# Patient Record
Sex: MALE | Race: WHITE | NOT HISPANIC OR LATINO | Employment: FULL TIME | URBAN - METROPOLITAN AREA
[De-identification: names, ages, dates, MRNs, and addresses within clinical notes are randomized per-mention and may not be internally consistent; named-entity substitution may affect disease eponyms.]

---

## 2020-11-23 ENCOUNTER — TELEPHONE (OUTPATIENT)
Dept: NEUROSURGERY | Facility: CLINIC | Age: 60
End: 2020-11-23

## 2020-11-23 NOTE — TELEPHONE ENCOUNTER
CALLED PT TO SCHEDULE APPT FROM REFERRAL/PT ADVISED THAT HE WAS SEEN AT [facility] THIS PAST Saturday 11/21/2020/PT ADVISED HE DID NOT HAVE ANY ADDITIONAL IMAGING DONE/HE WAS GIVEN AN INJECTION, STEROID AND FLEXERIL RX/STATES HE IS NOT HAVING ANY BOWEL OR BLADDER ISSUE BUT IS DUE TO RETURN TO WORK TODAY AND NOT SURE HOW HIS BACK IS GOING TO DO.     PLEASE CONTACT PT AT: [phone].    THANK YOU!

## 2020-12-04 NOTE — PROGRESS NOTES
"Subjective   History of Present Illness: Mitch Finney is a 60 y.o. male is here today as a self referral for chronic back pain . MRI [facility] 11/13/20.     He states he has chronic back pain. Previous back surgery by Dr. Felipe Diaz many years ago. He bent over last month to his dog and felt a \"pop\" in his back. He recently completed MDP with little relief.  Mr. Finney takes Ibuprofen 800 mg prn for pain.  The pain is much better than it was when the symptoms began in November.  He is just frustrated because his back will continue to go out about once a year like this and he will miss a couple of days of work and have to seek medical treatment.  He was hoping for an option to help him avoid or truncate these episodes more quickly.    While in the room and during my examination of the patient I wore a mask and eye protection.  I washed my hands before and after this patient encounter.  The patient was also wearing a mask.    Back Pain  The problem occurs constantly. The pain is present in the lumbar spine. The quality of the pain is described as aching. The pain is at a severity of 5/10. The pain is moderate. Associated symptoms include a fever, leg pain, numbness and weakness (L leg ). Pertinent negatives include no bladder incontinence or bowel incontinence. Treatments tried: MDP    Leg Pain   The pain is present in the left leg and right leg. The quality of the pain is described as aching. The pain is at a severity of 6/10. The pain is moderate. The pain has been intermittent since onset. Associated symptoms include numbness. Treatments tried: MDP        The following portions of the patient's history were reviewed and updated as appropriate: allergies, current medications, past family history, past medical history, past social history, past surgical history and problem list.    Review of Systems   Constitutional: Positive for fever. Negative for chills.   HENT: Negative.  Negative for congestion and sore " "throat.    Eyes: Negative.    Respiratory: Negative.    Cardiovascular: Negative.    Gastrointestinal: Negative.  Negative for bowel incontinence.   Endocrine: Negative.    Genitourinary: Negative.  Negative for bladder incontinence, difficulty urinating, testicular pain and urgency.   Musculoskeletal: Positive for back pain (thigh pain ). Negative for gait problem.   Skin: Negative.    Allergic/Immunologic: Negative.    Neurological: Positive for weakness (L leg ) and numbness.   Hematological: Negative.    Psychiatric/Behavioral: Negative.        Objective     Vitals:    12/18/20 1107   BP: 155/90   Pulse: 58   Temp: 98.7 °F (37.1 °C)   Weight: 94.5 kg (208 lb 6.4 oz)   Height: 188 cm (74\")     Body mass index is 26.76 kg/m².      Physical Exam  Neurologic Exam    Physical Exam:    CONSTITUTIONAL: This 60 year old right handed  male appears well developed, well-nourished and in no acute distress.    HEAD & FACE: the head and face are symmetric, normocephalic and atraumatic.    EYES: Inspection of the conjunctivae and lids reveals no swelling, erythema or discharge.  Pupils are round, equal and reactive to light and there is no scleral icterus.    EARS, NOSE, MOUTH & THROAT: On inspection, the ears and nose are within normal limits.    NECK: the neck is supple and symmetric. The trachea is midline with no masses.    PULMONARY: Respiratory effort is normal with no increased work of breathing or signs of respiratory distress.    CARDIOVASCULAR: Pedal pulses are +2/4 bilaterally. Examination of the extremities shows no edema or varicosities.    LYMPHATIC: There is no palpable lymphadenopathy of the neck.    MUSCULOSKELETAL: Gait and station are within normal limits. The spine has normal alignment and range of motion.    SKIN: The skin is warm, dry and intact, lumbar incision is well-healed    NEUROLOGIC:   Cranial Nerves 2-12 intact  Normal motor strength noted. Muscle bulk and tone are normal.  Sensory exam " reveals numbness on the lateral thigh above the knee on the left  Reflexes on the right side demonstrates 2/4 Knee Jerk Reflex, 1/4 Ankle Jerk Reflex and no ankle clonus on the right.   Reflexes on the left side demonstrates 2/4 Knee Jerk Reflex, 1/4 Ankle Jerk Reflex and no ankle clonus on the left.  Superficial/Primitive Reflexes: primitive reflexes were absent.  Pugh's, Babinski, and Clonus signs all negative.  No coordination deficit observed.  Radicular testing showed a negative Randall (RANDY) test and negative straight leg raise.  Cortical function is intact and without deficits. Speech is normal.    PSYCHIATRIC: oriented to person, place and time. Patient's mood and affect are normal.    Assessment/Plan   Independent Review of Radiographic Studies:      I personally reviewed the images from the following studies.    MRI done at a [facility] facility on 11/13/2020 reveals moderate disc bulge at L3-4 causing mild to moderate canal stenosis and left greater than right neuroforaminal narrowing.  There is an annular irregularity at L4-5 which contributes to only mild canal stenosis and mild to moderate neuroforaminal narrowing.    Medical Decision Making:      From the neurosurgical perspective there is no surgical option for isolated back pain. There is no persistent radiculopathy or loss of nerve function on exam to justify surgery. Generally most people respond favorably to physical therapy, NSAIDs or oral steroids when appropriate.  His symptoms are intermittent exacerbations and he has taken NSAIDs and oral steroids with temporary results.    In difficult situations like this one, pain management such as lumbar epidural steroids or facet rhizotomies are an option.  His symptoms are episodic and occur about once a year so I think it is more important that he develops a relationship with pain management so when he has a severe exacerbation he can get treatment quickly.    Since surgical pathology  was not identified radiographically or clinically, we will initiate conservative treatment and only plan on follow up as needed if surgical questions arise.    No follow-ups on file.    Diagnoses and all orders for this visit:    1. Chronic midline low back pain without sciatica (Primary)  -     Ambulatory Referral to Pain Management             Nicholas Cruz MD [facility]  Neurological Surgery

## 2020-12-18 ENCOUNTER — OFFICE VISIT (OUTPATIENT)
Dept: NEUROSURGERY | Facility: CLINIC | Age: 60
End: 2020-12-18

## 2020-12-18 VITALS
TEMPERATURE: 98.7 F | BODY MASS INDEX: 26.75 KG/M2 | HEIGHT: 74 IN | SYSTOLIC BLOOD PRESSURE: 155 MMHG | HEART RATE: 58 BPM | DIASTOLIC BLOOD PRESSURE: 90 MMHG | WEIGHT: 208.4 LBS

## 2020-12-18 DIAGNOSIS — G89.29 CHRONIC MIDLINE LOW BACK PAIN WITHOUT SCIATICA: Primary | ICD-10-CM

## 2020-12-18 DIAGNOSIS — M54.50 CHRONIC MIDLINE LOW BACK PAIN WITHOUT SCIATICA: Primary | ICD-10-CM

## 2020-12-18 PROCEDURE — 99204 OFFICE O/P NEW MOD 45 MIN: CPT | Performed by: NEUROLOGICAL SURGERY

## 2020-12-18 RX ORDER — IBUPROFEN 800 MG/1
TABLET ORAL
COMMUNITY
Start: 2020-11-06 | End: 2022-02-04

## 2021-03-16 ENCOUNTER — BULK ORDERING (OUTPATIENT)
Dept: CASE MANAGEMENT | Facility: OTHER | Age: 61
End: 2021-03-16

## 2021-03-16 ENCOUNTER — HOSPITAL ENCOUNTER (OUTPATIENT)
Dept: VACCINE CLINIC | Facility: HOSPITAL | Age: 61
Discharge: HOME | End: 2021-03-16
Attending: INTERNAL MEDICINE

## 2021-03-16 DIAGNOSIS — Z23 IMMUNIZATION DUE: ICD-10-CM

## 2021-04-06 ENCOUNTER — HOSPITAL ENCOUNTER (OUTPATIENT)
Dept: VACCINE CLINIC | Facility: HOSPITAL | Age: 61
Discharge: HOME | End: 2021-04-06
Attending: INTERNAL MEDICINE

## 2021-10-03 ENCOUNTER — DOCUMENTATION (OUTPATIENT)
Dept: UROLOGY | Facility: CLINIC | Age: 61
End: 2021-10-03

## 2021-10-03 DIAGNOSIS — N52.9 ERECTILE DYSFUNCTION, UNSPECIFIED ERECTILE DYSFUNCTION TYPE: Primary | ICD-10-CM

## 2021-10-03 RX ORDER — TADALAFIL 20 MG/1
20 TABLET ORAL DAILY PRN
Qty: 30 TABLET | Refills: 4 | Status: SHIPPED | OUTPATIENT
Start: 2021-10-03 | End: 2022-12-13 | Stop reason: SDUPTHER

## 2021-11-30 ENCOUNTER — IMMUNIZATION (OUTPATIENT)
Dept: VACCINE CLINIC | Facility: HOSPITAL | Age: 61
End: 2021-11-30

## 2021-11-30 PROCEDURE — 91300 HC SARSCOV02 VAC 30MCG/0.3ML IM: CPT | Performed by: INTERNAL MEDICINE

## 2021-11-30 PROCEDURE — 0004A HC ADM SARSCOV2 30MCG/0.3ML BOOSTER: CPT | Performed by: INTERNAL MEDICINE

## 2022-01-11 ENCOUNTER — DOCUMENTATION (OUTPATIENT)
Dept: UROLOGY | Facility: CLINIC | Age: 62
End: 2022-01-11

## 2022-01-11 DIAGNOSIS — J06.9 ACUTE UPPER RESPIRATORY INFECTION: Primary | ICD-10-CM

## 2022-01-11 RX ORDER — AZITHROMYCIN 250 MG/1
TABLET, FILM COATED ORAL
Qty: 6 TABLET | Refills: 0 | Status: SHIPPED | OUTPATIENT
Start: 2022-01-11 | End: 2022-01-16

## 2022-01-11 RX ORDER — PREDNISONE 20 MG/1
20 TABLET ORAL DAILY
Qty: 5 TABLET | Refills: 0 | Status: SHIPPED | OUTPATIENT
Start: 2022-01-11 | End: 2022-01-16

## 2022-02-04 ENCOUNTER — DOCUMENTATION (OUTPATIENT)
Dept: UROLOGY | Facility: CLINIC | Age: 62
End: 2022-02-04

## 2022-02-04 DIAGNOSIS — M62.830 SPASM OF THORACIC BACK MUSCLE: Primary | ICD-10-CM

## 2022-02-04 RX ORDER — BACLOFEN 10 MG/1
10 TABLET ORAL DAILY
Qty: 7 TABLET | Refills: 0 | Status: SHIPPED | OUTPATIENT
Start: 2022-02-04 | End: 2022-07-11

## 2022-02-04 RX ORDER — MELOXICAM 15 MG/1
15 TABLET ORAL DAILY
Qty: 7 TABLET | Refills: 0 | Status: SHIPPED | OUTPATIENT
Start: 2022-02-04 | End: 2022-02-11

## 2022-04-04 ENCOUNTER — DOCUMENTATION (OUTPATIENT)
Dept: UROLOGY | Facility: CLINIC | Age: 62
End: 2022-04-04

## 2022-04-04 DIAGNOSIS — M62.830 SPASM OF THORACIC BACK MUSCLE: Primary | ICD-10-CM

## 2022-04-04 RX ORDER — MELOXICAM 15 MG/1
15 TABLET ORAL DAILY
Qty: 30 TABLET | Refills: 2 | Status: SHIPPED | OUTPATIENT
Start: 2022-04-04 | End: 2022-06-24 | Stop reason: SDUPTHER

## 2022-05-27 DIAGNOSIS — J06.9 ACUTE UPPER RESPIRATORY INFECTION: Primary | ICD-10-CM

## 2022-05-27 RX ORDER — DOXYCYCLINE HYCLATE 100 MG/1
100 CAPSULE ORAL 2 TIMES DAILY
Qty: 20 CAPSULE | Refills: 0 | Status: SHIPPED | OUTPATIENT
Start: 2022-05-27 | End: 2022-06-24

## 2022-05-27 RX ORDER — BROMPHENIRAMINE MALEATE, PSEUDOEPHEDRINE HYDROCHLORIDE, AND DEXTROMETHORPHAN HYDROBROMIDE 2; 30; 10 MG/5ML; MG/5ML; MG/5ML
5 SYRUP ORAL 4 TIMES DAILY PRN
Qty: 240 ML | Refills: 0 | Status: SHIPPED | OUTPATIENT
Start: 2022-05-27 | End: 2022-06-24

## 2022-06-24 DIAGNOSIS — M62.830 SPASM OF THORACIC BACK MUSCLE: ICD-10-CM

## 2022-06-24 RX ORDER — MELOXICAM 15 MG/1
15 TABLET ORAL DAILY
Qty: 30 TABLET | Refills: 2 | Status: SHIPPED | OUTPATIENT
Start: 2022-06-24 | End: 2022-09-07 | Stop reason: SDUPTHER

## 2022-07-09 DIAGNOSIS — M62.830 SPASM OF THORACIC BACK MUSCLE: ICD-10-CM

## 2022-07-11 RX ORDER — BACLOFEN 10 MG/1
TABLET ORAL
Qty: 7 TABLET | Refills: 0 | Status: SHIPPED | OUTPATIENT
Start: 2022-07-11 | End: 2022-09-25 | Stop reason: SDUPTHER

## 2022-09-07 DIAGNOSIS — M62.830 SPASM OF THORACIC BACK MUSCLE: ICD-10-CM

## 2022-09-07 RX ORDER — MELOXICAM 15 MG/1
15 TABLET ORAL DAILY
Qty: 90 TABLET | Refills: 2 | Status: SHIPPED | OUTPATIENT
Start: 2022-09-07 | End: 2022-12-06

## 2022-09-25 ENCOUNTER — DOCUMENTATION (OUTPATIENT)
Dept: UROLOGY | Facility: CLINIC | Age: 62
End: 2022-09-25

## 2022-09-25 DIAGNOSIS — M62.830 SPASM OF THORACIC BACK MUSCLE: Primary | ICD-10-CM

## 2022-09-25 RX ORDER — BACLOFEN 10 MG/1
10 TABLET ORAL 2 TIMES DAILY
Qty: 14 TABLET | Refills: 0 | Status: SHIPPED | OUTPATIENT
Start: 2022-09-25 | End: 2022-10-02

## 2022-09-26 DIAGNOSIS — J06.9 ACUTE UPPER RESPIRATORY INFECTION: Primary | ICD-10-CM

## 2022-09-26 RX ORDER — PREDNISONE 50 MG/1
50 TABLET ORAL DAILY
Qty: 5 TABLET | Refills: 0 | Status: SHIPPED | OUTPATIENT
Start: 2022-09-26 | End: 2022-10-01

## 2022-12-13 DIAGNOSIS — N52.9 ERECTILE DYSFUNCTION, UNSPECIFIED ERECTILE DYSFUNCTION TYPE: ICD-10-CM

## 2022-12-13 RX ORDER — TADALAFIL 20 MG/1
20 TABLET ORAL DAILY PRN
Qty: 30 TABLET | Refills: 4 | Status: SHIPPED | OUTPATIENT
Start: 2022-12-13

## 2023-01-18 DIAGNOSIS — M62.830 SPASM OF THORACIC BACK MUSCLE: Primary | ICD-10-CM

## 2023-01-18 RX ORDER — PREDNISONE 50 MG/1
50 TABLET ORAL DAILY
Qty: 5 TABLET | Refills: 0 | Status: SHIPPED | OUTPATIENT
Start: 2023-01-18 | End: 2023-03-11 | Stop reason: SDUPTHER

## 2023-02-03 ENCOUNTER — APPOINTMENT (OUTPATIENT)
Dept: CT IMAGING | Facility: HOSPITAL | Age: 63
End: 2023-02-03
Payer: OTHER MISCELLANEOUS

## 2023-02-03 ENCOUNTER — HOSPITAL ENCOUNTER (EMERGENCY)
Facility: HOSPITAL | Age: 63
Discharge: HOME | End: 2023-02-03
Attending: EMERGENCY MEDICINE | Admitting: EMERGENCY MEDICINE
Payer: OTHER MISCELLANEOUS

## 2023-02-03 VITALS
DIASTOLIC BLOOD PRESSURE: 100 MMHG | OXYGEN SATURATION: 98 % | WEIGHT: 212 LBS | BODY MASS INDEX: 27.21 KG/M2 | HEART RATE: 62 BPM | SYSTOLIC BLOOD PRESSURE: 164 MMHG | HEIGHT: 74 IN | TEMPERATURE: 97.3 F | RESPIRATION RATE: 18 BRPM

## 2023-02-03 DIAGNOSIS — S16.1XXA STRAIN OF NECK MUSCLE, INITIAL ENCOUNTER: ICD-10-CM
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
## 2023-02-03 DIAGNOSIS — S09.90XA CLOSED HEAD INJURY, INITIAL ENCOUNTER: Primary | ICD-10-CM

## 2023-02-03 PROCEDURE — 99282 EMERGENCY DEPT VISIT SF MDM: CPT

## 2023-02-03 PROCEDURE — 72125 CT NECK SPINE W/O DYE: CPT

## 2023-02-03 PROCEDURE — 70450 CT HEAD/BRAIN W/O DYE: CPT

## 2023-02-03 NOTE — ED TRIAGE NOTES
Patient states he slipped on ice at work and fell and hit head, patient denies LOC or blood thinners.

## 2023-02-03 NOTE — ED NOTES
Pt reports slipping, falling, and hitting the back of his head at work. Pt reports mild vision changes when he first hit his head, but reports that his vision is back to baseline. Pt denies fatigue or dizziness.

## 2023-02-03 NOTE — DISCHARGE INSTRUCTIONS
Follow up with your primary care doctor within 48-72 hours. Rest. Take Acetaminophen (Tylenol) every 4-6 hours, as needed, for pain. Ice to neck with gentle stretching for 24-48 hours.  Often individuals develop a headache associated with nausea in the days/hours after a head injury. This is called a concussion and does not warrant a return to the ED UNLESS: you develop significant worsening of pain, profuse vomiting, dizziness, changes in vision, difficulty walking/speaking, weakness or numbness to your extremities.

## 2023-02-03 NOTE — ED PROVIDER NOTES
EMERGENCY DEPARTMENT ENCOUNTER    Room Number:  S04/04  Date of encounter:  2/3/2023  PCP: Hao Price MD  Patient Care Team:  Hao Price MD as PCP - General (Family Medicine)   Independent Historians: Patient        PPE: Patient was placed in face mask in first look. Patient was wearing facemask when I entered the room and throughout our encounter. I wore full protective equipment throughout this patient encounter including a face mask, and gloves. Hand hygiene was performed before donning protective equipment and after removal when leaving the room.      HPI:  Chief Complaint: Head injury  A complete HPI/ROS/PMH/PSH/SH/FH are unobtainable due to: Nothing    Chronic or social conditions impacting patient care (social determinants of health): None    Context: Mitch Finney is a 62 y.o. male who arrives to the ED via vehicle.  Patient presents with c/o closed head injury that he sustained after a slip and fall on the ice approximately 2 hours ago.  He states that he was walking in the parking lot at work when he slipped on a patch of ice, fell backwards and hit the back of his head.   Patient also complains of headache and neck pain.  Patient denies LOC, nausea, vomiting, visual changes, dizziness or lightheadedness, back pain or any injuries.  Patient states that nothing makes the symptoms better and nothing worsens symptoms.  Patient is not anticoagulated.    Review of prior external notes (non-ED): [facility], patient was seen by neurosurgery 12/18/2020 for chronic low back pain.  He was referred to pain management.    Review of prior external test results outside of this encounter: X-ray right shoulder 1/31/2022 no acute findings.    PAST MEDICAL HISTORY  Active Ambulatory Problems     Diagnosis Date Noted   • Chronic midline low back pain without sciatica 12/18/2020     Resolved Ambulatory Problems     Diagnosis Date Noted   • No Resolved Ambulatory Problems      No Additional Past Medical History       The patient has started, but not completed, their COVID-19 vaccination series.    PAST SURGICAL HISTORY  No past surgical history on file.      FAMILY HISTORY  No family history on file.      SOCIAL HISTORY  Social History     Socioeconomic History   • Marital status:          ALLERGIES  Patient has no known allergies.        REVIEW OF SYSTEMS  Review of Systems     All systems reviewed and negative except for those discussed in HPI.       PHYSICAL EXAM    I have reviewed the triage vital signs and nursing notes.    ED Triage Vitals   Temp Heart Rate Resp BP SpO2   02/03/23 1259 02/03/23 1259 02/03/23 1259 02/03/23 1345 02/03/23 1259   97.3 °F (36.3 °C) 59 18 (!) 175/106 98 %       Physical Exam  HENT:      Head:         GENERAL: Well appearing, nontoxic appearing, not distressed  HENT: normocephalic,  EYES: no scleral icterus, PERRL  CV: regular rhythm, regular rate, no murmur  RESPIRATORY: normal effort, CTAB  ABDOMEN: soft   MUSCULOSKELETAL: no deformity  Cervical vertebral tenderness to palpation no thoracic or lumbar  No step off or crepitus noted  NEURO: alert, moves all extremities, follows commands, mental status normal/baseline  Recent and remote memory functions are normal  Patient is attentive with normal concentration  Language is fluent  Speech is clear  Speech is nondysarthric  Symmetric smile with no facial droop  Eyes close shut strongly bilaterally  Symmetric eyebrow raise bilaterally  EOMI, PERRL  CN II-XII grossly normal otherwise  5/5 strength to bilateral upper and lower extremities  No pronator drift  Intact FNF   SKIN: warm, dry, no rash   Psych: Appropriate mood and affect  Nursing notes and vital signs reviewed          LAB RESULTS  No results found for this or any previous visit (from the past 24 hour(s)).    Ordered the above labs and independently reviewed the results.        RADIOLOGY  CT Head Without Contrast, CT Cervical Spine  Without Contrast    Result Date: 2/3/2023  CT HEAD AND CERVICAL SPINE  HISTORY: Slipped and fell on ice about 2 hours ago. The patient reportedly fell and hit the back of the head. Headache and neck pain.  TECHNIQUE: Noncontrast CT scans of the head and cervical spine with multiplanar reformatted images is provided. No previous imaging for correlation.  Radiation dose reduction techniques were utilized, including automated exposure control and exposure modulation based on body size.  FINDINGS: The ventricles are normal in caliber. The brain parenchyma, extra-axial spaces, and the bones of the skull appear normal. There is no intracranial hemorrhage. No scalp laceration or radiopaque foreign body identified. Orbital structures appear normal.  Cervical vertebral body height and alignment are normal. There is no cervical fracture. Mild degenerative change is observed between the odontoid and the anterior C1 ring.  No hemorrhage or prevertebral soft tissue edema is present.  There is degenerative disc change at C5-6 with loss of disc height and a posterior midline disc bulge which indents the thecal sac and moderately narrows the spinal canal to about 8 mm just to the left of midline. There is also degenerative disc change at C6-7 with minimal posterior disc bulge and mild canal stenosis. Mild osseous foraminal narrowing is observed at C6-7, right more than left. No other stenosis is present. No pneumothorax in the small visualized portions of the lung apices.      1. Negative head CT. 2. No acute posttraumatic abnormality identified in the cervical spine. There is degenerative disc change at C5-6 and C6-7 with chronic appearing narrowing of the spinal canal at C5-6 as discussed.  This report was finalized on 2/3/2023 4:16 PM by Dr. Yeison Ashford M.D.        I ordered the above noted radiological studies. Reviewed by me and discussed with radiologist.  See dictation for official radiology  interpretation.      PROCEDURES    Procedures    DIFFERENTIAL DIAGNOSIS:  Differential Diagnosis for head injury include but are not limited to the following:  Cerebral contusion, Concussion ( with or without LOC), Head contusion, Skull fracture, orbital fracture, Hematoma, Intracranial Hemorrhage, Laceration, Post Concussion Syndrome.       PROGRESS, DATA ANALYSIS, CONSULTS, AND MEDICAL DECISION MAKING    All labs have been independently reviewed by me.  All radiology studies have been reviewed by me and discussed with radiologist dictating the report.   EKG's independently viewed and interpreted by me.  Discussion below represents my analysis of pertinent findings related to patient's condition, differential diagnosis, treatment plan and final disposition.        ED Course as of 02/03/23 1953 Fri Feb 03, 2023   1430 Patient is a well-appearing 62-year-old who presents today after a slip and fall resulting in a closed head injury and neck pain.  Plan for CT of the head and cervical spine to rule out intercranial abnormality or bony abnormality.  Patient verbalized understanding and is agreeable to the above plan. [MS]   1620 Patient updated on unremarkable CTs of the head and cervical spine.  Discussed with patient that he will be sore for several days, this is to be expected.  Discussed with him to take Tylenol and ice painful areas for the next 24 to 48 hours.  Closed head precautions discussed in detail along with strict return to ER precautions.  Patient verbalized understanding and is agreeable to the above plan. [MS]      ED Course User Index  [MS] Tiana Hannon, APRABELARDO         DISPOSITION  ED Disposition     ED Disposition   Discharge    Condition   Stable    Comment   --           Discussed plan for discharge, as there is no emergent indication for admission. Pt/family is agreeable and understands need for follow up and repeat testing.  Pt is aware that discharge does not mean that nothing is  wrong but it indicates no emergency is present that requires admission and they must continue care with follow-up as given below or physician of their choice.   Patient/Family voiced understanding of above instructions.  Patient discharged in stable condition.    DIAGNOSIS  Final diagnoses:   Closed head injury, initial encounter   Strain of neck muscle, initial encounter   Fall, initial encounter       FOLLOW UP   Hao Price MD  [id number]  [address]  [place] [id number] [phone]    Call in 3 days        RX     Medication List      No changes were made to your prescriptions during this visit.             AS OF 19:53 EST VITALS:    BP - 164/100  HR - 62  TEMP - 97.3 °F (36.3 °C) (Tympanic)  O2 SATS - 98%      MEDICATIONS GIVEN IN ER    Medications - No data to display             Note Disclaimer: At [facility], we believe that sharing information builds trust and better relationships. You are receiving this note because you recently visited [facility]. It is possible you will see health information before a provider has talked with you about it. This kind of information can be easy to misunderstand. To help you fully understand what it means for your health, we urge you to discuss this note with your provider.       Tiana Hannon, APRN  02/03/23 1953

## 2023-03-11 ENCOUNTER — DOCUMENTATION (OUTPATIENT)
Dept: UROLOGY | Facility: CLINIC | Age: 63
End: 2023-03-11
Payer: OTHER MISCELLANEOUS

## 2023-03-11 DIAGNOSIS — M62.830 SPASM OF THORACIC BACK MUSCLE: Primary | ICD-10-CM

## 2023-03-11 RX ORDER — BACLOFEN 10 MG/1
10 TABLET ORAL 2 TIMES DAILY
Qty: 14 TABLET | Refills: 0 | Status: SHIPPED | OUTPATIENT
Start: 2023-03-11 | End: 2023-03-18

## 2023-03-11 RX ORDER — PREDNISONE 50 MG/1
50 TABLET ORAL DAILY
Qty: 5 TABLET | Refills: 0 | Status: SHIPPED | OUTPATIENT
Start: 2023-03-11

## 2023-04-18 DIAGNOSIS — M62.830 SPASM OF THORACIC BACK MUSCLE: Primary | ICD-10-CM

## 2023-04-18 RX ORDER — MELOXICAM 15 MG/1
TABLET ORAL
Qty: 90 TABLET | Refills: 3 | Status: SHIPPED | OUTPATIENT
Start: 2023-04-18

## 2023-08-18 DIAGNOSIS — M62.830 SPASM OF THORACIC BACK MUSCLE: ICD-10-CM

## 2023-08-18 RX ORDER — PREDNISONE 50 MG/1
50 TABLET ORAL DAILY
Qty: 5 TABLET | Refills: 0 | Status: SHIPPED | OUTPATIENT
Start: 2023-08-18

## 2023-10-11 DIAGNOSIS — J06.9 ACUTE UPPER RESPIRATORY INFECTION: Primary | ICD-10-CM

## 2023-10-11 RX ORDER — AMOXICILLIN AND CLAVULANATE POTASSIUM 875; 125 MG/1; MG/1
1 TABLET, FILM COATED ORAL 2 TIMES DAILY
Qty: 20 TABLET | Refills: 0 | Status: SHIPPED | OUTPATIENT
Start: 2023-10-11

## 2023-12-03 DIAGNOSIS — M62.830 SPASM OF THORACIC BACK MUSCLE: ICD-10-CM

## 2023-12-04 RX ORDER — PREDNISONE 50 MG/1
50 TABLET ORAL DAILY
Qty: 5 TABLET | Refills: 0 | OUTPATIENT
Start: 2023-12-04

## 2024-01-20 ENCOUNTER — DOCUMENTATION (OUTPATIENT)
Dept: UROLOGY | Facility: CLINIC | Age: 64
End: 2024-01-20
Payer: OTHER MISCELLANEOUS

## 2024-01-20 DIAGNOSIS — J01.10 ACUTE NON-RECURRENT FRONTAL SINUSITIS: Primary | ICD-10-CM

## 2024-01-20 RX ORDER — DOXYCYCLINE HYCLATE 100 MG/1
100 CAPSULE ORAL 2 TIMES DAILY
Qty: 20 CAPSULE | Refills: 0 | Status: SHIPPED | OUTPATIENT
Start: 2024-01-20 | End: 2024-01-30

## 2024-02-17 ENCOUNTER — DOCUMENTATION (OUTPATIENT)
Dept: UROLOGY | Facility: CLINIC | Age: 64
End: 2024-02-17
Payer: OTHER MISCELLANEOUS

## 2024-02-17 DIAGNOSIS — B37.9 CANDIDIASIS: Primary | ICD-10-CM

## 2024-02-17 RX ORDER — NYSTATIN AND TRIAMCINOLONE ACETONIDE 100000; 1 [USP'U]/G; MG/G
OINTMENT TOPICAL
Qty: 60 G | Refills: 1 | Status: SHIPPED | OUTPATIENT
Start: 2024-02-17

## 2024-05-06 DIAGNOSIS — M62.830 SPASM OF THORACIC BACK MUSCLE: ICD-10-CM

## 2024-05-06 RX ORDER — MELOXICAM 15 MG/1
TABLET ORAL
Qty: 30 TABLET | Refills: 0 | Status: SHIPPED | OUTPATIENT
Start: 2024-05-06

## 2024-06-06 DIAGNOSIS — M62.830 SPASM OF THORACIC BACK MUSCLE: ICD-10-CM

## 2024-06-06 RX ORDER — MELOXICAM 15 MG/1
15 TABLET ORAL DAILY
Qty: 30 TABLET | Refills: 0 | Status: SHIPPED | OUTPATIENT
Start: 2024-06-06

## 2024-07-08 DIAGNOSIS — M62.830 SPASM OF THORACIC BACK MUSCLE: ICD-10-CM

## 2024-07-08 RX ORDER — MELOXICAM 15 MG/1
15 TABLET ORAL DAILY
Qty: 30 TABLET | Refills: 0 | Status: SHIPPED | OUTPATIENT
Start: 2024-07-08

## 2024-08-03 DIAGNOSIS — N52.9 ERECTILE DYSFUNCTION, UNSPECIFIED ERECTILE DYSFUNCTION TYPE: ICD-10-CM

## 2024-08-03 DIAGNOSIS — M62.830 SPASM OF THORACIC BACK MUSCLE: ICD-10-CM

## 2024-08-05 RX ORDER — TADALAFIL 20 MG/1
20 TABLET ORAL DAILY PRN
Qty: 30 TABLET | Refills: 4 | Status: SHIPPED | OUTPATIENT
Start: 2024-08-05

## 2024-08-05 RX ORDER — MELOXICAM 15 MG/1
15 TABLET ORAL DAILY
Qty: 30 TABLET | Refills: 0 | Status: SHIPPED | OUTPATIENT
Start: 2024-08-05

## 2024-08-16 DIAGNOSIS — M62.830 SPASM OF THORACIC BACK MUSCLE: ICD-10-CM

## 2024-08-19 RX ORDER — MELOXICAM 15 MG/1
15 TABLET ORAL DAILY
Qty: 30 TABLET | Refills: 0 | Status: SHIPPED | OUTPATIENT
Start: 2024-08-19

## 2024-10-10 DIAGNOSIS — M62.830 SPASM OF THORACIC BACK MUSCLE: ICD-10-CM

## 2024-10-10 RX ORDER — MELOXICAM 15 MG/1
15 TABLET ORAL DAILY
Qty: 30 TABLET | Refills: 0 | Status: SHIPPED | OUTPATIENT
Start: 2024-10-10